# Patient Record
Sex: MALE | URBAN - METROPOLITAN AREA
[De-identification: names, ages, dates, MRNs, and addresses within clinical notes are randomized per-mention and may not be internally consistent; named-entity substitution may affect disease eponyms.]

---

## 2022-01-03 ENCOUNTER — APPOINTMENT (RX ONLY)
Dept: URBAN - METROPOLITAN AREA CLINIC 310 | Facility: CLINIC | Age: 47
Setting detail: DERMATOLOGY
End: 2022-01-03

## 2022-01-03 DIAGNOSIS — L30.0 NUMMULAR DERMATITIS: ICD-10-CM

## 2022-01-03 DIAGNOSIS — B07.8 OTHER VIRAL WARTS: ICD-10-CM

## 2022-01-03 PROBLEM — L30.9 DERMATITIS, UNSPECIFIED: Status: ACTIVE | Noted: 2022-01-03

## 2022-01-03 PROCEDURE — ? KOH PREP

## 2022-01-03 PROCEDURE — ? PRESCRIPTION

## 2022-01-03 PROCEDURE — ? TREATMENT REGIMEN

## 2022-01-03 PROCEDURE — ? LIQUID NITROGEN

## 2022-01-03 PROCEDURE — 99203 OFFICE O/P NEW LOW 30 MIN: CPT | Mod: 25

## 2022-01-03 PROCEDURE — ? COUNSELING

## 2022-01-03 PROCEDURE — 17110 DESTRUCTION B9 LES UP TO 14: CPT

## 2022-01-03 RX ORDER — HYDROCORTISONE 25 MG/G
1 OINTMENT TOPICAL BID
Qty: 28.35 | Refills: 3 | Status: ERX | COMMUNITY
Start: 2022-01-03

## 2022-01-03 RX ORDER — BETAMETHASONE DIPROPIONATE 0.5 MG/G
1 OINTMENT TOPICAL BID
Qty: 45 | Refills: 3 | Status: ERX | COMMUNITY
Start: 2022-01-03

## 2022-01-03 RX ADMIN — HYDROCORTISONE 1: 25 OINTMENT TOPICAL at 00:00

## 2022-01-03 RX ADMIN — BETAMETHASONE DIPROPIONATE 1: 0.5 OINTMENT TOPICAL at 00:00

## 2022-01-03 ASSESSMENT — LOCATION SIMPLE DESCRIPTION DERM
LOCATION SIMPLE: RIGHT PRETIBIAL REGION
LOCATION SIMPLE: LEFT BUTTOCK
LOCATION SIMPLE: LEFT UPPER BACK

## 2022-01-03 ASSESSMENT — LOCATION ZONE DERM
LOCATION ZONE: TRUNK
LOCATION ZONE: LEG

## 2022-01-03 ASSESSMENT — LOCATION DETAILED DESCRIPTION DERM
LOCATION DETAILED: LEFT BUTTOCK
LOCATION DETAILED: RIGHT DISTAL PRETIBIAL REGION
LOCATION DETAILED: LEFT INFERIOR UPPER BACK

## 2022-01-03 NOTE — PROCEDURE: TREATMENT REGIMEN
Plan: Spoke about referring patient to rheumatology for further evaluation of psoriatic arthritis (Dr. Aramis MD office information provided today). Patient advised to start betamethasone on leg and HCT on buttocks.
Initiate Treatment: Betamethasone + HCT
Detail Level: Zone

## 2022-01-03 NOTE — PROCEDURE: LIQUID NITROGEN
Spray Paint Technique: No
Show Topical Anesthesia Variable?: Yes
Medical Necessity Clause: This procedure was medically necessary because the lesions that were treated were:
Number Of Freeze-Thaw Cycles: 1 freeze-thaw cycle
Post-Care Instructions: I reviewed with the patient in detail post-care instructions. Patient is to wear sunprotection, and avoid picking at any of the treated lesions. Pt may apply Vaseline to crusted or scabbing areas.
Detail Level: Detailed
Consent: The patient's consent was obtained including but not limited to risks of crusting, scabbing, blistering, scarring, darker or lighter pigmentary change, recurrence, incomplete removal and infection.
Medical Necessity Information: It is in your best interest to select a reason for this procedure from the list below. All of these items fulfill various CMS LCD requirements except the new and changing color options.
Spray Paint Text: The liquid nitrogen was applied to the skin utilizing a spray paint frosting technique.